# Patient Record
Sex: FEMALE | Race: BLACK OR AFRICAN AMERICAN
[De-identification: names, ages, dates, MRNs, and addresses within clinical notes are randomized per-mention and may not be internally consistent; named-entity substitution may affect disease eponyms.]

---

## 2018-06-06 ENCOUNTER — HOSPITAL ENCOUNTER (EMERGENCY)
Dept: HOSPITAL 17 - NEDAMB | Age: 23
LOS: 1 days | Discharge: HOME | End: 2018-06-07
Payer: COMMERCIAL

## 2018-06-06 VITALS
SYSTOLIC BLOOD PRESSURE: 142 MMHG | DIASTOLIC BLOOD PRESSURE: 81 MMHG | RESPIRATION RATE: 16 BRPM | HEART RATE: 88 BPM | TEMPERATURE: 99 F | OXYGEN SATURATION: 100 %

## 2018-06-06 VITALS
DIASTOLIC BLOOD PRESSURE: 75 MMHG | TEMPERATURE: 98.7 F | HEART RATE: 78 BPM | RESPIRATION RATE: 18 BRPM | OXYGEN SATURATION: 100 % | SYSTOLIC BLOOD PRESSURE: 134 MMHG

## 2018-06-06 VITALS
RESPIRATION RATE: 18 BRPM | TEMPERATURE: 98.7 F | SYSTOLIC BLOOD PRESSURE: 134 MMHG | HEART RATE: 78 BPM | DIASTOLIC BLOOD PRESSURE: 75 MMHG | OXYGEN SATURATION: 100 %

## 2018-06-06 DIAGNOSIS — Z79.899: ICD-10-CM

## 2018-06-06 DIAGNOSIS — G40.909: ICD-10-CM

## 2018-06-06 DIAGNOSIS — F43.25: Primary | ICD-10-CM

## 2018-06-06 DIAGNOSIS — F12.90: ICD-10-CM

## 2018-06-06 DIAGNOSIS — Z72.0: ICD-10-CM

## 2018-06-06 LAB
ALBUMIN SERPL-MCNC: 4.3 GM/DL (ref 3.4–5)
ALP SERPL-CCNC: 59 U/L (ref 45–117)
ALT SERPL-CCNC: 14 U/L (ref 10–53)
AST SERPL-CCNC: 8 U/L (ref 15–37)
BACTERIA #/AREA URNS HPF: (no result) /HPF
BASOPHILS # BLD AUTO: 0.1 TH/MM3 (ref 0–0.2)
BASOPHILS NFR BLD: 0.9 % (ref 0–2)
BILIRUB SERPL-MCNC: 0.4 MG/DL (ref 0.2–1)
BUN SERPL-MCNC: 15 MG/DL (ref 7–18)
CALCIUM SERPL-MCNC: 8.7 MG/DL (ref 8.5–10.1)
CHLORIDE SERPL-SCNC: 107 MEQ/L (ref 98–107)
COLOR UR: (no result)
CREAT SERPL-MCNC: 1.4 MG/DL (ref 0.5–1)
EOSINOPHIL # BLD: 0.1 TH/MM3 (ref 0–0.4)
EOSINOPHIL NFR BLD: 1.2 % (ref 0–4)
ERYTHROCYTE [DISTWIDTH] IN BLOOD BY AUTOMATED COUNT: 13.5 % (ref 11.6–17.2)
GFR SERPLBLD BASED ON 1.73 SQ M-ARVRAT: 55 ML/MIN (ref 89–?)
GLUCOSE SERPL-MCNC: 154 MG/DL (ref 74–106)
GLUCOSE UR STRIP-MCNC: (no result) MG/DL
HCO3 BLD-SCNC: 21.3 MEQ/L (ref 21–32)
HCT VFR BLD CALC: 40.7 % (ref 35–46)
HGB BLD-MCNC: 13.5 GM/DL (ref 11.6–15.3)
HGB UR QL STRIP: (no result)
HYALINE CASTS #/AREA URNS LPF: 8 /LPF
KETONES UR STRIP-MCNC: (no result) MG/DL
LYMPHOCYTES # BLD AUTO: 1.4 TH/MM3 (ref 1–4.8)
LYMPHOCYTES NFR BLD AUTO: 18.4 % (ref 9–44)
MCH RBC QN AUTO: 30.5 PG (ref 27–34)
MCHC RBC AUTO-ENTMCNC: 33.1 % (ref 32–36)
MCV RBC AUTO: 92.2 FL (ref 80–100)
MONOCYTE #: 0.3 TH/MM3 (ref 0–0.9)
MONOCYTES NFR BLD: 3.7 % (ref 0–8)
MUCOUS THREADS #/AREA URNS LPF: (no result) /LPF
NEUTROPHILS # BLD AUTO: 5.8 TH/MM3 (ref 1.8–7.7)
NEUTROPHILS NFR BLD AUTO: 75.8 % (ref 16–70)
NITRITE UR QL STRIP: (no result)
PHENYTOIN (DILANTIN): (no result) MCG/ML (ref 10–20)
PLATELET # BLD: 230 TH/MM3 (ref 150–450)
PMV BLD AUTO: 9.8 FL (ref 7–11)
PROT SERPL-MCNC: 8.4 GM/DL (ref 6.4–8.2)
RBC # BLD AUTO: 4.42 MIL/MM3 (ref 4–5.3)
SODIUM SERPL-SCNC: 141 MEQ/L (ref 136–145)
SP GR UR STRIP: 1.01 (ref 1–1.03)
SQUAMOUS #/AREA URNS HPF: 6 /HPF (ref 0–5)
URINE LEUKOCYTE ESTERASE: (no result)
WBC # BLD AUTO: 7.6 TH/MM3 (ref 4–11)

## 2018-06-06 PROCEDURE — 81001 URINALYSIS AUTO W/SCOPE: CPT

## 2018-06-06 PROCEDURE — 99284 EMERGENCY DEPT VISIT MOD MDM: CPT

## 2018-06-06 PROCEDURE — 80053 COMPREHEN METABOLIC PANEL: CPT

## 2018-06-06 PROCEDURE — 84703 CHORIONIC GONADOTROPIN ASSAY: CPT

## 2018-06-06 PROCEDURE — 80185 ASSAY OF PHENYTOIN TOTAL: CPT

## 2018-06-06 PROCEDURE — 84443 ASSAY THYROID STIM HORMONE: CPT

## 2018-06-06 PROCEDURE — 80307 DRUG TEST PRSMV CHEM ANLYZR: CPT

## 2018-06-06 PROCEDURE — 85025 COMPLETE CBC W/AUTO DIFF WBC: CPT

## 2018-06-06 NOTE — PD
HPI


Chief Complaint:  Psychiatric Symptoms


Time Seen by Provider:  17:11


Travel History


International Travel<30 days:  No


Contact w/Intl Traveler<30days:  No





History of Present Illness


HPI


23-year-old -American female with history of seizure disorder presents 

the emergency department under the [place] act for suicidal ideation.  Patient 

reportedly told her girlfriend, and she was going to use a BB gun to have the 

police shoot her.  Police were called, and the patient did in fact tried to 

pull a BB gun on them prior to them apprehending her.  Patient states history 

of seizure disorder which is supposed to be treated with Dilantin, however she 

states she stopped the Dilantin months ago, and has not had a seizure for over 

2 years.  She denies any other medical problems currently.  She is not a 

cooperative patient.  She has no known drug allergies





Critical access hospital


Social History


Alcohol Use:  Yes


Tobacco Use:  Yes


Substance Use:  Yes





Allergies-Medications


(Allergen,Severity, Reaction):  


Coded Allergies:  


     No Known Allergies (Unverified , 6/6/18)





Review of Systems


ROS Limitations:  Uncooperative, Poor Historian


Except as stated in HPI:  all other systems reviewed are Neg


General / Constitutional:  No: Fever


Eyes:  No: Visual changes


HENT:  No: Headaches


Cardiovascular:  No: Chest Pain or Discomfort


Respiratory:  No: Shortness of Breath


Gastrointestinal:  No: Abdominal Pain


Genitourinary:  No: Dysuria


Musculoskeletal:  No: Pain


Skin:  No Rash


Neurologic:  No: Weakness


Psychiatric:  No: Depression


Endocrine:  No: Polydipsia


Hematologic/Lymphatic:  No: Easy Bruising





Physical Exam


Exam Limitations:  Poor Historian, Uncooperative


Narrative


GENERAL: Patient appears upset but medically stable.


SKIN: Warm and dry.  Normal color.  Normal turgor.  Patient has superficial 

abrasion to the chin and left shoulder.


HEAD: Atraumatic. Normocephalic.  Nontender.


EYES: Pupils equal and round. No scleral icterus. No injection or drainage. 


ENT: No nasal bleeding or discharge.  Mucous membranes pink and moist.  Pharynx 

is clear.  Airways patent.  No dental injury.


NECK: Trachea midline.  No bony tenderness or step-off.  Range of motion is full


CARDIOVASCULAR: Regular rate and rhythm.  


RESPIRATORY: No accessory muscle use. Clear to auscultation. Breath sounds 

equal bilaterally. 


GASTROINTESTINAL: Abdomen soft, non-tender, nondistended. Hepatic and splenic 

margins not palpable. 


MUSCULOSKELETAL: Extremities without clubbing, cyanosis, or edema. No obvious 

deformities. 


NEUROLOGICAL: Awake and alert. No obvious cranial nerve deficits.  Motor 

grossly within normal limits. Five out of 5 muscle strength in the arms and 

legs.  Normal speech.





Data


Data


Orders





 Orders


Complete Blood Count With Diff (6/6/18 17:11)


Comprehensive Metabolic Panel (6/6/18 17:11)


Thyroid Stimulating Hormone (6/6/18 17:11)


Urinalysis - C+S If Indicated (6/6/18 17:11)


Ed Urine Pregnancytest Poc (6/6/18 17:11)


Phenytoin (Dilantin) (6/6/18 17:11)


Psych Screen (6/6/18 17:11)


Drug Screen, Random Urine (6/6/18 17:11)


Alcohol (Ethanol) (6/6/18 17:11)








MDM


Medical Decision Making


Medical Screen Exam Complete:  Yes


Emergency Medical Condition:  Yes


Differential Diagnosis


Baker act.  Suicidal ideation.  Depression.


Narrative Course


Patient appears emotionally upset but medically stable.


Psychiatric labs ordered including CBC, CMP, urinalysis, urine pregnancy, and 

Dilantin level.  Urine drug screen and alcohol levels ordered as well.


Patient is medically clear for psychiatric evaluation.


Condition:  Stable











Julian Guevara Jun 6, 2018 17:20

## 2018-06-07 VITALS
TEMPERATURE: 98.8 F | OXYGEN SATURATION: 100 % | SYSTOLIC BLOOD PRESSURE: 114 MMHG | DIASTOLIC BLOOD PRESSURE: 58 MMHG | RESPIRATION RATE: 18 BRPM | HEART RATE: 96 BPM

## 2018-06-07 NOTE — PD
__________________________________________________





History of Present Illness


Chief Complaint:  Psychiatric Symptoms


Time Seen by Provider:  10:35


Travel History


International Travel<30 Days:  No


Contact w/Intl Traveler<30days:  No


Known affected area:  No





Legal Status


Legal Status:  Baker Act


Baker Act Signed By:  Britt ASHLEY


Baker Act Comment:  Officer [employer] #[id number]


History of Present Illness:


History of Present Illness


HPI


23-year-old -American, single  female in a domestic partner  relationship

, with significant medical  history of seizure disorder  who presents the 

emergency department under the [place] act initiated for suicidal ideation.





The police  were called by her  wife . She reported to the police that  the 

patient  told her that she was going to use a BB gun to have the police shoot 

her.  Police were called, and the patient did in fact tried to pull a BB gun on 

them prior to them apprehending her.  Patient was monitored in secure 

environment and presented no suicidality and no behavioral concerns.


Electronic medical is reviewed.  No previous contact with [facility] psychiatry.  Lab work revealed positive toxicology for cannabinoids.  

Some slight alteration in her AST and ALT.





Patient is seen.  Patient is alert, oriented, calm and mostly cooperative with 

the interview.  She states " I'm  decent and I am not in need of any help."  

Patient states that she was watching a television show with her partner and 

that they were arguing and and her wife called the police.  She states that 

this has happened in the past and that she has been [place] acted before for 

similar issues.  The patient denies any hallucinations and does not appear to 

be internally stimulated.  There is no evidence of any hyacinth or hypomania.  She 

does not present any significant clinical evidence of any depression.  She 

states that she is not suicidal and not homicidal and "I have no interest in 

killing myself or anyone else".  The patient reports that she needs to be 

discharged because she needs to get back to work.  She denies any previous 

psychiatric history but does report she has received therapy in the past.  

Remainder of psychiatric review of systems is negative.





Telephone call to Sandi at [phone]. She has no concerns for the patient'

s  safety. States that the patient is not  a danger and that " the   just 

took it too far". She  will pick the patient up when she is  discharged.





PFSH


Past Medical History


Hx Anticoagulant Therapy:  No


Asthma:  Yes


Autoimmune Disease:  No


Cardiovascular Problems:  No


Chemotherapy:  No


Cerebrovascular Accident:  No


Diabetes:  No


Gastrointestinal Disorders:  No


Genitourinary:  No


Medical other:  No


Musculoskeletal:  No


Neurologic:  Yes


Psychiatric:  No


Reproductive:  No


Respiratory:  Yes


Integumentary:  No


Migraines:  Yes


Seizures:  Yes


Tetanus Vaccination:  Unknown


Influenza Vaccination:  No


Pregnant?:  Not Pregnant





Past Surgical History


Surgical History:  No Previous Surgery


Hysterectomy:  No





Psychiatric History


Psychiatric History


Hx Psychiatric Treatment:  


Pt denies any psych history or any previous visits.  No previous history


of suicide attempt.  No history of self-injurious behavior.


History of Inpatient Treatment:  No


Guns or firearms in home:  No





Social History


Single, in domestic partner relationship.  She is currently employed at a body 

shop.


Hx Alcohol Use:  Yes


Hx Tobacco Use:  No


Hx Substance Use:  Yes


Substance Use Type:  Alcohol, Marijuana


Hx of Substance Use Treatment:  No





Family Psychiatric History


Negative





Allergies-Medications


(Allergen,Severity, Reaction):  


Coded Allergies:  


     No Known Allergies (Unverified , 6/6/18)


Reported Meds & Prescriptions





Reported Meds & Active Scripts


Active


Active Prescriptions or Reported Medications Unobtainable





Review of Systems


Psychiatric:  DENIES: Anxiety, Confusion, Mood changes, Depression, 

Hallucinations, Agitation, Suicidal Ideation, Homicidal Ideation, Delusions


Except as stated in HPI:  all other systems reviewed are Neg





Mental Status Examination


Appearance:  Appropriate (Wearing hospital attire)


Consciousness:  Alert


Orientation:  x4


Motor Activity:  Normal gait


Speech:  Unremarkable


Language:  Adequate


Fund of Knowledge:  Adequate


Attention and Concentration:  Adequate


Memory:  Unremarkable


Mood:  Appropriate


Affect:  Appropriate


Thought Process & Associations:  Intact, Logical, Goal directed


Thought Content:  Appropriate


Hallucination Type:  None


Delusion Type:  None


Suicidal Ideation:  No


Suicidal Plan:  No


Suicidal Intention:  No


Homicidal Ideation:  No


Homicidal Plan:  No


Homicidal Intention:  No


Insight:  Fair


Judgment:  Impulsive





MDM


Medical Decision Making


Medical Record Reviewed:  Yes


Assessment/Plan


History of Present Illness


HPI


23-year-old -American, single  female in a domestic partner  relationship

, with significant medical  history of seizure disorder, no psychiatric history 

who presents the emergency department under the [place]cl[place] act initiated for 

suicidal ideation. The police  were called by her  wife . She reported to the 

police that  the patient  told her that she was going to use a BB gun to have 

the police shoot her.  Police were called, and the patient did in fact tried to 

pull a BB gun on them prior to them apprehending her.  Patient was monitored in 

secure environment and presented no suicidality and no behavioral concerns.


Patient is  seen. Collateral is obtained. No evidence of unstable  mental 

illness as defined under the Baker act. Patient  does not meet Barclay act 

criteria and therefore it will be  lifted.





Orders





 Orders


Complete Blood Count With Diff (6/6/18 17:11)


Comprehensive Metabolic Panel (6/6/18 17:11)


Thyroid Stimulating Hormone (6/6/18 17:11)


Urinalysis - C+S If Indicated (6/6/18 17:11)


Ed Urine Pregnancytest Poc (6/6/18 17:11)


Phenytoin (Dilantin) (6/6/18 17:11)


Psych Screen (6/6/18 17:11)


Drug Screen, Random Urine (6/6/18 17:11)


Alcohol (Ethanol) (6/6/18 17:11)


Diet Regular Basic (6/6/18 Dinner)


Diet Regular Basic (6/7/18 Breakfast)


Diet Regular Basic (6/7/18 Lunch)





Results





Vital Signs








  Date Time  Temp Pulse Resp B/P (MAP) Pulse Ox O2 Delivery O2 Flow Rate FiO2


 


6/7/18 06:33 98.8 96 18 114/58 (76) 100   


 


6/6/18 23:30 99.0 88 16 142/81 (101) 100 Room Air  


 


6/6/18 18:29 98.7 78 18 134/75 (94) 100   


 


6/6/18 17:17 98.7 78 18 134/75 (94) 100   











Laboratory Tests








Test


  6/6/18


17:23 6/6/18


21:19


 


White Blood Count 7.6  


 


Red Blood Count 4.42  


 


Hemoglobin 13.5  


 


Hematocrit 40.7  


 


Mean Corpuscular Volume 92.2  


 


Mean Corpuscular Hemoglobin 30.5  


 


Mean Corpuscular Hemoglobin


Concent 33.1 


  


 


 


Red Cell Distribution Width 13.5  


 


Platelet Count 230  


 


Mean Platelet Volume 9.8  


 


Neutrophils (%) (Auto) 75.8  


 


Lymphocytes (%) (Auto) 18.4  


 


Monocytes (%) (Auto) 3.7  


 


Eosinophils (%) (Auto) 1.2  


 


Basophils (%) (Auto) 0.9  


 


Neutrophils # (Auto) 5.8  


 


Lymphocytes # (Auto) 1.4  


 


Monocytes # (Auto) 0.3  


 


Eosinophils # (Auto) 0.1  


 


Basophils # (Auto) 0.1  


 


CBC Comment DIFF FINAL  


 


Differential Comment   


 


Blood Urea Nitrogen 15  


 


Creatinine 1.40  


 


Random Glucose 154  


 


Total Protein 8.4  


 


Albumin 4.3  


 


Calcium Level 8.7  


 


Alkaline Phosphatase 59  


 


Aspartate Amino Transf


(AST/SGOT) 8 


  


 


 


Alanine Aminotransferase


(ALT/SGPT) 14 


  


 


 


Total Bilirubin 0.4  


 


Sodium Level 141  


 


Potassium Level 3.0  


 


Chloride Level 107  


 


Carbon Dioxide Level 21.3  


 


Anion Gap 13  


 


Estimat Glomerular Filtration


Rate 55 


  


 


 


Thyroid Stimulating Hormone


3rd Gen 0.896 


  


 


 


Phenytoin (Dilantin) Level LESS THAN 0.4  


 


Ethyl Alcohol Level LESS THAN 3  


 


Urine Color  LIGHT-YELLOW 


 


Urine Turbidity  CLEAR 


 


Urine pH  5.5 


 


Urine Specific Gravity  1.007 


 


Urine Protein  TRACE 


 


Urine Glucose (UA)  NEG 


 


Urine Ketones  NEG 


 


Urine Occult Blood  NEG 


 


Urine Nitrite  NEG 


 


Urine Bilirubin  NEG 


 


Urine Urobilinogen  LESS THAN 2.0 


 


Urine Leukocyte Esterase  NEG 


 


Urine RBC  1 


 


Urine WBC  3 


 


Urine Squamous Epithelial


Cells 


  6 


 


 


Urine Bacteria  RARE 


 


Urine Hyaline Casts  8 


 


Urine Mucus  FEW 


 


Microscopic Urinalysis Comment


  


  CULT NOT


INDICATED


 


Urine Opiates Screen  NEG 


 


Urine Barbiturates Screen  NEG 


 


Urine Amphetamines Screen  NEG 


 


Urine Benzodiazepines Screen  NEG 


 


Urine Cocaine Screen  NEG 


 


Urine Cannabinoids Screen  POS 











Diagnosis





 Primary Impression:  


 Adjustment disorder


Psychiatrically Cleared:  Yes


***Med/ Other Pt Specific Info:  No Meds Exist/No RX given


Prescriptions


Unable to Obtain Active Prescriptions or Reported Meds


Disposition:  01 DISCHARGE HOME


Condition:  Stable





Problem Qualifiers








 Primary Impression:  


 Adjustment disorder


 Qualified Codes:  F43.25 - Adjustment disorder with mixed disturbance of 

emotions and conduct








Zahra Gaitan [facility] Jun 7, 2018 10:45

## 2018-06-07 NOTE — PD
Physical Exam


Date Seen by Provider:  Jun 7, 2018


Time Seen by Provider:  10:57


Narrative


23-year-old -American female previously medically cleared under the 

Baker act for psychiatric evaluation.  Patient has been cleared psychiatrically 

for discharge.  Patient remains medically stable for discharge at this time.  

Patient has history of seizures, but is refusing to take any medication for it 

at this time, as she has not had a seizure in many years.  Follow-up will be 

based on psychiatric note.





Data


Data


Last Documented VS





Vital Signs








  Date Time  Temp Pulse Resp B/P (MAP) Pulse Ox O2 Delivery O2 Flow Rate FiO2


 


6/7/18 06:33 98.8 96 18 114/58 (76) 100   


 


6/6/18 23:30      Room Air  








Orders





 Orders


Complete Blood Count With Diff (6/6/18 17:11)


Comprehensive Metabolic Panel (6/6/18 17:11)


Thyroid Stimulating Hormone (6/6/18 17:11)


Urinalysis - C+S If Indicated (6/6/18 17:11)


Ed Urine Pregnancytest Poc (6/6/18 17:11)


Phenytoin (Dilantin) (6/6/18 17:11)


Psych Screen (6/6/18 17:11)


Drug Screen, Random Urine (6/6/18 17:11)


Alcohol (Ethanol) (6/6/18 17:11)


Diet Regular Basic (6/6/18 Dinner)


Diet Regular Basic (6/7/18 Breakfast)


Diet Regular Basic (6/7/18 Lunch)





Labs





Laboratory Tests








Test


  6/6/18


17:23 6/6/18


21:19


 


White Blood Count 7.6 TH/MM3  


 


Red Blood Count 4.42 MIL/MM3  


 


Hemoglobin 13.5 GM/DL  


 


Hematocrit 40.7 %  


 


Mean Corpuscular Volume 92.2 FL  


 


Mean Corpuscular Hemoglobin 30.5 PG  


 


Mean Corpuscular Hemoglobin


Concent 33.1 % 


  


 


 


Red Cell Distribution Width 13.5 %  


 


Platelet Count 230 TH/MM3  


 


Mean Platelet Volume 9.8 FL  


 


Neutrophils (%) (Auto) 75.8 %  


 


Lymphocytes (%) (Auto) 18.4 %  


 


Monocytes (%) (Auto) 3.7 %  


 


Eosinophils (%) (Auto) 1.2 %  


 


Basophils (%) (Auto) 0.9 %  


 


Neutrophils # (Auto) 5.8 TH/MM3  


 


Lymphocytes # (Auto) 1.4 TH/MM3  


 


Monocytes # (Auto) 0.3 TH/MM3  


 


Eosinophils # (Auto) 0.1 TH/MM3  


 


Basophils # (Auto) 0.1 TH/MM3  


 


CBC Comment DIFF FINAL  


 


Differential Comment   


 


Blood Urea Nitrogen 15 MG/DL  


 


Creatinine 1.40 MG/DL  


 


Random Glucose 154 MG/DL  


 


Total Protein 8.4 GM/DL  


 


Albumin 4.3 GM/DL  


 


Calcium Level 8.7 MG/DL  


 


Alkaline Phosphatase 59 U/L  


 


Aspartate Amino Transf


(AST/SGOT) 8 U/L 


  


 


 


Alanine Aminotransferase


(ALT/SGPT) 14 U/L 


  


 


 


Total Bilirubin 0.4 MG/DL  


 


Sodium Level 141 MEQ/L  


 


Potassium Level 3.0 MEQ/L  


 


Chloride Level 107 MEQ/L  


 


Carbon Dioxide Level 21.3 MEQ/L  


 


Anion Gap 13 MEQ/L  


 


Estimat Glomerular Filtration


Rate 55 ML/MIN 


  


 


 


Thyroid Stimulating Hormone


3rd Gen 0.896 uIU/ML 


  


 


 


Phenytoin (Dilantin) Level


  LESS THAN 0.4


MCG/ML 


 


 


Ethyl Alcohol Level


  LESS THAN 3


MG/DL 


 


 


Urine Color  LIGHT-YELLOW 


 


Urine Turbidity  CLEAR 


 


Urine pH  5.5 


 


Urine Specific Gravity  1.007 


 


Urine Protein  TRACE mg/dL 


 


Urine Glucose (UA)  NEG mg/dL 


 


Urine Ketones  NEG mg/dL 


 


Urine Occult Blood  NEG 


 


Urine Nitrite  NEG 


 


Urine Bilirubin  NEG 


 


Urine Urobilinogen


  


  LESS THAN 2.0


MG/DL


 


Urine Leukocyte Esterase  NEG 


 


Urine RBC  1 /hpf 


 


Urine WBC  3 /hpf 


 


Urine Squamous Epithelial


Cells 


  6 /hpf 


 


 


Urine Bacteria  RARE /hpf 


 


Urine Hyaline Casts  8 /lpf 


 


Urine Mucus  FEW /lpf 


 


Microscopic Urinalysis Comment


  


  CULT NOT


INDICATED


 


Urine Opiates Screen  NEG 


 


Urine Barbiturates Screen  NEG 


 


Urine Amphetamines Screen  NEG 


 


Urine Benzodiazepines Screen  NEG 


 


Urine Cocaine Screen  NEG 


 


Urine Cannabinoids Screen  POS 











MDM


Medical Record Reviewed:  Yes


Supervised Visit with HUSSAIN:  Yes


Narrative Course


23-year-old -American female previously medically cleared under the 

Baker act for psychiatric evaluation.  Patient has been cleared psychiatrically 

for discharge.  Patient remains medically stable for discharge at this time.  

Patient has history of seizures, but is refusing to take any medication for it 

at this time, as she has not had a seizure in many years.  Follow-up will be 

based on psychiatric note.


Diagnosis





 Primary Impression:  


 Adjustment disorder


 Qualified Codes:  F43.20 - Adjustment disorder, unspecified


Scripts


Unable to Obtain Active Prescriptions or Reported Meds


Disposition:  01 DISCHARGE HOME


Condition:  Stable











Julian Guevara Jun 7, 2018 11:00